# Patient Record
Sex: MALE | Race: WHITE | URBAN - NONMETROPOLITAN AREA
[De-identification: names, ages, dates, MRNs, and addresses within clinical notes are randomized per-mention and may not be internally consistent; named-entity substitution may affect disease eponyms.]

---

## 2024-10-21 ENCOUNTER — APPOINTMENT (RX ONLY)
Dept: URBAN - NONMETROPOLITAN AREA CLINIC 5 | Facility: CLINIC | Age: 54
Setting detail: DERMATOLOGY
End: 2024-10-21

## 2024-10-21 DIAGNOSIS — H02.6 XANTHELASMA OF EYELID: ICD-10-CM

## 2024-10-21 PROBLEM — H02.61 XANTHELASMA OF RIGHT UPPER EYELID: Status: ACTIVE | Noted: 2024-10-21

## 2024-10-21 PROBLEM — H02.64 XANTHELASMA OF LEFT UPPER EYELID: Status: ACTIVE | Noted: 2024-10-21

## 2024-10-21 PROCEDURE — 99202 OFFICE O/P NEW SF 15 MIN: CPT

## 2024-10-21 PROCEDURE — ? COUNSELING

## 2024-10-21 PROCEDURE — ? TREATMENT REGIMEN

## 2024-10-21 ASSESSMENT — LOCATION SIMPLE DESCRIPTION DERM
LOCATION SIMPLE: RIGHT SUPERIOR EYELID
LOCATION SIMPLE: LEFT SUPERIOR EYELID

## 2024-10-21 ASSESSMENT — LOCATION ZONE DERM: LOCATION ZONE: EYELID

## 2024-10-21 ASSESSMENT — LOCATION DETAILED DESCRIPTION DERM
LOCATION DETAILED: LEFT LATERAL SUPERIOR EYELID
LOCATION DETAILED: RIGHT LATERAL SUPERIOR EYELID

## 2024-10-21 NOTE — PROCEDURE: TREATMENT REGIMEN
Plan: Reassured patient this is a benign process. Patient does get annual cholesterol screenings and those have been within normal limits. Continue to monitor cholesterol annually was encouraged. Patient is bothered by these cosmetically so discussed there are options for treatment. Discussed laser treatment vs excision. Recommended Dr. Wilson or [facility] clinic.  I did call Dr. Wilson’s office and he would do surgical excision of the area.  At [facility], Dr Hopson would do a surgical intervention in the [place] location.  Pt can follow up with above if interested
Detail Level: Zone